# Patient Record
Sex: FEMALE | Race: WHITE | NOT HISPANIC OR LATINO | URBAN - METROPOLITAN AREA
[De-identification: names, ages, dates, MRNs, and addresses within clinical notes are randomized per-mention and may not be internally consistent; named-entity substitution may affect disease eponyms.]

---

## 2019-04-26 ENCOUNTER — APPOINTMENT (RX ONLY)
Dept: URBAN - METROPOLITAN AREA OTHER 10 | Facility: OTHER | Age: 64
Setting detail: DERMATOLOGY
End: 2019-04-26

## 2019-04-26 DIAGNOSIS — D22 MELANOCYTIC NEVI: ICD-10-CM

## 2019-04-26 DIAGNOSIS — L81.4 OTHER MELANIN HYPERPIGMENTATION: ICD-10-CM

## 2019-04-26 DIAGNOSIS — L21.8 OTHER SEBORRHEIC DERMATITIS: ICD-10-CM

## 2019-04-26 DIAGNOSIS — L73.8 OTHER SPECIFIED FOLLICULAR DISORDERS: ICD-10-CM

## 2019-04-26 DIAGNOSIS — D18.0 HEMANGIOMA: ICD-10-CM

## 2019-04-26 DIAGNOSIS — L82.1 OTHER SEBORRHEIC KERATOSIS: ICD-10-CM

## 2019-04-26 DIAGNOSIS — Z41.9 ENCOUNTER FOR PROCEDURE FOR PURPOSES OTHER THAN REMEDYING HEALTH STATE, UNSPECIFIED: ICD-10-CM

## 2019-04-26 DIAGNOSIS — L65.0 TELOGEN EFFLUVIUM: ICD-10-CM

## 2019-04-26 PROBLEM — L40.0 PSORIASIS VULGARIS: Status: ACTIVE | Noted: 2019-04-26

## 2019-04-26 PROBLEM — D22.72 MELANOCYTIC NEVI OF LEFT LOWER LIMB, INCLUDING HIP: Status: ACTIVE | Noted: 2019-04-26

## 2019-04-26 PROBLEM — D22.71 MELANOCYTIC NEVI OF RIGHT LOWER LIMB, INCLUDING HIP: Status: ACTIVE | Noted: 2019-04-26

## 2019-04-26 PROBLEM — D22.61 MELANOCYTIC NEVI OF RIGHT UPPER LIMB, INCLUDING SHOULDER: Status: ACTIVE | Noted: 2019-04-26

## 2019-04-26 PROBLEM — I10 ESSENTIAL (PRIMARY) HYPERTENSION: Status: ACTIVE | Noted: 2019-04-26

## 2019-04-26 PROBLEM — D18.01 HEMANGIOMA OF SKIN AND SUBCUTANEOUS TISSUE: Status: ACTIVE | Noted: 2019-04-26

## 2019-04-26 PROBLEM — I48.91 UNSPECIFIED ATRIAL FIBRILLATION: Status: ACTIVE | Noted: 2019-04-26

## 2019-04-26 PROBLEM — D22.62 MELANOCYTIC NEVI OF LEFT UPPER LIMB, INCLUDING SHOULDER: Status: ACTIVE | Noted: 2019-04-26

## 2019-04-26 PROBLEM — D22.5 MELANOCYTIC NEVI OF TRUNK: Status: ACTIVE | Noted: 2019-04-26

## 2019-04-26 PROCEDURE — ? COUNSELING

## 2019-04-26 PROCEDURE — 99203 OFFICE O/P NEW LOW 30 MIN: CPT

## 2019-04-26 PROCEDURE — ? OTHER

## 2019-04-26 PROCEDURE — ? OTHER (COSMETIC)

## 2019-04-26 ASSESSMENT — LOCATION DETAILED DESCRIPTION DERM
LOCATION DETAILED: RIGHT VENTRAL DISTAL FOREARM
LOCATION DETAILED: RIGHT DISTAL POSTERIOR THIGH
LOCATION DETAILED: RIGHT PROXIMAL DORSAL FOREARM
LOCATION DETAILED: RIGHT INFERIOR UPPER BACK
LOCATION DETAILED: LEFT PROXIMAL POSTERIOR THIGH
LOCATION DETAILED: RIGHT ANTERIOR DISTAL THIGH
LOCATION DETAILED: LEFT DISTAL DORSAL FOREARM
LOCATION DETAILED: LEFT DISTAL POSTERIOR THIGH
LOCATION DETAILED: LEFT SUPERIOR FOREHEAD
LOCATION DETAILED: MID-FRONTAL SCALP
LOCATION DETAILED: LEFT ANTERIOR EARLOBE
LOCATION DETAILED: EPIGASTRIC SKIN
LOCATION DETAILED: LEFT ANTERIOR PROXIMAL UPPER ARM
LOCATION DETAILED: INFERIOR MID FOREHEAD
LOCATION DETAILED: SUPERIOR LUMBAR SPINE
LOCATION DETAILED: LEFT VENTRAL PROXIMAL FOREARM
LOCATION DETAILED: RIGHT VENTRAL PROXIMAL FOREARM
LOCATION DETAILED: LEFT ANTERIOR PROXIMAL THIGH

## 2019-04-26 ASSESSMENT — LOCATION SIMPLE DESCRIPTION DERM
LOCATION SIMPLE: RIGHT THIGH
LOCATION SIMPLE: RIGHT UPPER BACK
LOCATION SIMPLE: RIGHT POSTERIOR THIGH
LOCATION SIMPLE: LEFT THIGH
LOCATION SIMPLE: RIGHT FOREARM
LOCATION SIMPLE: ANTERIOR SCALP
LOCATION SIMPLE: LEFT FOREARM
LOCATION SIMPLE: LEFT UPPER ARM
LOCATION SIMPLE: ABDOMEN
LOCATION SIMPLE: LOWER BACK
LOCATION SIMPLE: LEFT FOREHEAD
LOCATION SIMPLE: INFERIOR FOREHEAD
LOCATION SIMPLE: LEFT EAR
LOCATION SIMPLE: LEFT POSTERIOR THIGH

## 2019-04-26 ASSESSMENT — LOCATION ZONE DERM
LOCATION ZONE: FACE
LOCATION ZONE: LEG
LOCATION ZONE: SCALP
LOCATION ZONE: ARM
LOCATION ZONE: EAR
LOCATION ZONE: TRUNK

## 2019-04-26 NOTE — PROCEDURE: OTHER
Detail Level: Simple
Note Text (......Xxx Chief Complaint.): This diagnosis correlates with the
Other (Free Text): Continue TGel  shampoo as needed

## 2020-07-20 ENCOUNTER — OFFICE VISIT (OUTPATIENT)
Dept: URBAN - METROPOLITAN AREA CLINIC 96 | Facility: CLINIC | Age: 65
End: 2020-07-20
Payer: COMMERCIAL

## 2020-07-20 ENCOUNTER — WEB ENCOUNTER (OUTPATIENT)
Dept: URBAN - METROPOLITAN AREA CLINIC 96 | Facility: CLINIC | Age: 65
End: 2020-07-20

## 2020-07-20 DIAGNOSIS — K76.0 FATTY LIVER: ICD-10-CM

## 2020-07-20 DIAGNOSIS — R06.02 SHORTNESS OF BREATH: ICD-10-CM

## 2020-07-20 DIAGNOSIS — E66.9 OBESITY: ICD-10-CM

## 2020-07-20 DIAGNOSIS — R53.83 FATIGUE: ICD-10-CM

## 2020-07-20 DIAGNOSIS — K92.1 RECTAL BLEEDING: ICD-10-CM

## 2020-07-20 DIAGNOSIS — I48.91 AFIB: ICD-10-CM

## 2020-07-20 DIAGNOSIS — Z79.01 ANTICOAGULANT LONG-TERM USE: ICD-10-CM

## 2020-07-20 DIAGNOSIS — K51.90 ULCERATIVE COLITIS: ICD-10-CM

## 2020-07-20 PROCEDURE — 99213 OFFICE O/P EST LOW 20 MIN: CPT | Performed by: INTERNAL MEDICINE

## 2020-07-20 RX ORDER — FOLIC ACID 1 MG/1
TAKE 1 TABLET (1 MG) BY ORAL ROUTE ONCE DAILY FOR 90 DAYS TABLET ORAL 1
Qty: 90 | Refills: 3 | Status: ACTIVE | COMMUNITY
Start: 2020-03-24 | End: 2021-03-19

## 2020-07-20 RX ORDER — SULFASALAZINE 500 MG/1
TAKE 4 TABLETS BY ORAL ROUTE 2 TIMES A DAY FOR 90 DAYS TABLET ORAL 2
Qty: 720 | Refills: 1 | Status: ACTIVE | COMMUNITY
Start: 2020-03-24 | End: 2020-09-20

## 2020-07-20 RX ORDER — LEVOTHYROXINE SODIUM 112 UG/1
TABLET ORAL
Qty: 0 | Refills: 0 | Status: ACTIVE | COMMUNITY
Start: 1900-01-01

## 2020-07-20 RX ORDER — ESOMEPRAZOLE MAGNESIUM 40 MG
TAKE ONE CAPSULE BY MOUTH ONE TIME DAILY CAPSULE,DELAYED RELEASE (ENTERIC COATED) ORAL
Qty: 90 | Refills: 3 | Status: ACTIVE | COMMUNITY
Start: 2019-08-12 | End: 2020-08-06

## 2020-07-20 RX ORDER — MESALAMINE 1000 MG/1
INSERT 1 SUPPOSITORY BY RECTAL ROUTE DAILY SUPPOSITORY RECTAL 1
Qty: 90 | Refills: 1 | Status: ACTIVE | COMMUNITY
Start: 2020-03-24

## 2020-07-20 RX ORDER — PRAMOXINE HYDROCHLORIDE 150 MG/15G
AS DIRECTED AEROSOL, FOAM RECTAL BID
Qty: 20 | Refills: 1 | OUTPATIENT
Start: 2020-07-20 | End: 2020-08-09

## 2020-07-20 RX ORDER — SODIUM, POTASSIUM,MAG SULFATES 17.5-3.13G
177 ML SOLUTION, RECONSTITUTED, ORAL ORAL AS DIRECTED
Qty: 1 BOX | Refills: 0 | OUTPATIENT
Start: 2020-07-20 | End: 2020-07-21

## 2020-07-20 NOTE — HPI-TODAY'S VISIT:
64 y.o. WF Hemorrhoid bled for several months then quit Concerned anemic b/c weak Can hear it dripping Blood squirting out Not sure if afib or not b/c fatigued w/ walking Some mucus in stool No diarrhea Doesn't feel like colitis

## 2020-07-21 ENCOUNTER — TELEPHONE ENCOUNTER (OUTPATIENT)
Dept: URBAN - METROPOLITAN AREA CLINIC 92 | Facility: CLINIC | Age: 65
End: 2020-07-21

## 2020-07-21 ENCOUNTER — WEB ENCOUNTER (OUTPATIENT)
Dept: URBAN - METROPOLITAN AREA CLINIC 92 | Facility: CLINIC | Age: 65
End: 2020-07-21

## 2020-07-21 LAB
A/G RATIO: 1.4
ALBUMIN: 4.3
ALKALINE PHOSPHATASE: 88
ALT (SGPT): 12
AST (SGOT): 19
BILIRUBIN, TOTAL: <0.2
BUN/CREATININE RATIO: 14
BUN: 12
C-REACTIVE PROTEIN, QUANT: 9
CALCIUM: 9.5
CARBON DIOXIDE, TOTAL: 21
CHLORIDE: 105
CREATININE: 0.83
EGFR IF AFRICN AM: 87
EGFR IF NONAFRICN AM: 75
FERRITIN, SERUM: 10
GLOBULIN, TOTAL: 3
GLUCOSE: 108
HEMATOCRIT: 30.9
HEMOGLOBIN: 9
MCH: 22.6
MCHC: 29.1
MCV: 77
NRBC: (no result)
PLATELETS: 454
POTASSIUM: 4.6
PROTEIN, TOTAL: 7.3
RBC: 3.99
RDW: 17.9
SODIUM: 142
VITAMIN B12: 467
VITAMIN D, 25-HYDROXY: 37.2
WBC: 12.7

## 2020-07-21 RX ORDER — FERROUS SULFATE TAB EC 325 MG (65 MG FE EQUIVALENT) 325 (65 FE) MG
1 TABLET TABLET DELAYED RESPONSE ORAL BID
Qty: 180 TABLET | Refills: 0 | OUTPATIENT
Start: 2020-07-21

## 2020-07-21 RX ORDER — FERRIC CARBOXYMALTOSE INJECTION 50 MG/ML
AS DIRECTED INJECTION, SOLUTION INTRAVENOUS
Qty: 1 | Refills: 0 | OUTPATIENT
Start: 2020-07-21

## 2020-07-28 ENCOUNTER — LAB OUTSIDE AN ENCOUNTER (OUTPATIENT)
Dept: URBAN - METROPOLITAN AREA CLINIC 92 | Facility: CLINIC | Age: 65
End: 2020-07-28

## 2020-08-12 ENCOUNTER — OFFICE VISIT (OUTPATIENT)
Dept: URBAN - METROPOLITAN AREA CLINIC 97 | Facility: CLINIC | Age: 65
End: 2020-08-12
Payer: COMMERCIAL

## 2020-08-12 VITALS
TEMPERATURE: 97.6 F | BODY MASS INDEX: 36.96 KG/M2 | RESPIRATION RATE: 18 BRPM | DIASTOLIC BLOOD PRESSURE: 67 MMHG | HEIGHT: 66 IN | WEIGHT: 230 LBS | SYSTOLIC BLOOD PRESSURE: 114 MMHG

## 2020-08-12 DIAGNOSIS — E61.1 IRON DEFICIENCY: ICD-10-CM

## 2020-08-12 PROCEDURE — 96365 THER/PROPH/DIAG IV INF INIT: CPT | Performed by: INTERNAL MEDICINE

## 2020-08-12 RX ORDER — FOLIC ACID 1 MG/1
TAKE 1 TABLET (1 MG) BY ORAL ROUTE ONCE DAILY FOR 90 DAYS TABLET ORAL 1
Qty: 90 | Refills: 3 | Status: ACTIVE | COMMUNITY
Start: 2020-03-24 | End: 2021-03-19

## 2020-08-12 RX ORDER — FERROUS SULFATE TAB EC 325 MG (65 MG FE EQUIVALENT) 325 (65 FE) MG
1 TABLET TABLET DELAYED RESPONSE ORAL BID
Qty: 180 TABLET | Refills: 0 | Status: ACTIVE | COMMUNITY
Start: 2020-07-21

## 2020-08-12 RX ORDER — MESALAMINE 1000 MG/1
INSERT 1 SUPPOSITORY BY RECTAL ROUTE DAILY SUPPOSITORY RECTAL 1
Qty: 90 | Refills: 1 | Status: ACTIVE | COMMUNITY
Start: 2020-03-24

## 2020-08-12 RX ORDER — FERRIC CARBOXYMALTOSE INJECTION 50 MG/ML
AS DIRECTED INJECTION, SOLUTION INTRAVENOUS
Qty: 1 | Refills: 0 | Status: ACTIVE | COMMUNITY
Start: 2020-07-21

## 2020-08-12 RX ORDER — SULFASALAZINE 500 MG/1
TAKE 4 TABLETS BY ORAL ROUTE 2 TIMES A DAY FOR 90 DAYS TABLET ORAL 2
Qty: 720 | Refills: 1 | Status: ACTIVE | COMMUNITY
Start: 2020-03-24 | End: 2020-09-20

## 2020-08-12 RX ORDER — LEVOTHYROXINE SODIUM 112 UG/1
TABLET ORAL
Qty: 0 | Refills: 0 | Status: ACTIVE | COMMUNITY
Start: 1900-01-01

## 2020-08-19 ENCOUNTER — OFFICE VISIT (OUTPATIENT)
Dept: URBAN - METROPOLITAN AREA CLINIC 97 | Facility: CLINIC | Age: 65
End: 2020-08-19
Payer: COMMERCIAL

## 2020-08-19 VITALS
TEMPERATURE: 96.8 F | HEART RATE: 84 BPM | BODY MASS INDEX: 36.96 KG/M2 | HEIGHT: 66 IN | DIASTOLIC BLOOD PRESSURE: 66 MMHG | RESPIRATION RATE: 18 BRPM | WEIGHT: 230 LBS | SYSTOLIC BLOOD PRESSURE: 101 MMHG

## 2020-08-19 DIAGNOSIS — E61.1 IRON DEFICIENCY: ICD-10-CM

## 2020-08-19 PROCEDURE — 96365 THER/PROPH/DIAG IV INF INIT: CPT | Performed by: INTERNAL MEDICINE

## 2020-08-19 RX ORDER — MESALAMINE 1000 MG/1
INSERT 1 SUPPOSITORY BY RECTAL ROUTE DAILY SUPPOSITORY RECTAL 1
Qty: 90 | Refills: 1 | Status: ACTIVE | COMMUNITY
Start: 2020-03-24

## 2020-08-19 RX ORDER — FOLIC ACID 1 MG/1
TAKE 1 TABLET (1 MG) BY ORAL ROUTE ONCE DAILY FOR 90 DAYS TABLET ORAL 1
Qty: 90 | Refills: 3 | Status: ACTIVE | COMMUNITY
Start: 2020-03-24 | End: 2021-03-19

## 2020-08-19 RX ORDER — LEVOTHYROXINE SODIUM 112 UG/1
TABLET ORAL
Qty: 0 | Refills: 0 | Status: ACTIVE | COMMUNITY
Start: 1900-01-01

## 2020-08-19 RX ORDER — FERRIC CARBOXYMALTOSE INJECTION 50 MG/ML
AS DIRECTED INJECTION, SOLUTION INTRAVENOUS
Qty: 1 | Refills: 0 | Status: ACTIVE | COMMUNITY
Start: 2020-07-21

## 2020-08-19 RX ORDER — FERROUS SULFATE TAB EC 325 MG (65 MG FE EQUIVALENT) 325 (65 FE) MG
1 TABLET TABLET DELAYED RESPONSE ORAL BID
Qty: 180 TABLET | Refills: 0 | Status: ACTIVE | COMMUNITY
Start: 2020-07-21

## 2020-08-19 RX ORDER — SULFASALAZINE 500 MG/1
TAKE 4 TABLETS BY ORAL ROUTE 2 TIMES A DAY FOR 90 DAYS TABLET ORAL 2
Qty: 720 | Refills: 1 | Status: ACTIVE | COMMUNITY
Start: 2020-03-24 | End: 2020-09-20

## 2020-08-25 ENCOUNTER — OFFICE VISIT (OUTPATIENT)
Dept: URBAN - METROPOLITAN AREA SURGERY CENTER 18 | Facility: SURGERY CENTER | Age: 65
End: 2020-08-25

## 2020-10-26 ENCOUNTER — TELEPHONE ENCOUNTER (OUTPATIENT)
Dept: URBAN - METROPOLITAN AREA CLINIC 96 | Facility: CLINIC | Age: 65
End: 2020-10-26

## 2020-11-24 ENCOUNTER — TELEPHONE ENCOUNTER (OUTPATIENT)
Dept: URBAN - METROPOLITAN AREA CLINIC 96 | Facility: CLINIC | Age: 65
End: 2020-11-24

## 2020-11-24 RX ORDER — ESOMEPRAZOLE MAGNESIUM 40 MG
TAKE ONE CAPSULE BY MOUTH ONE TIME DAILY CAPSULE,DELAYED RELEASE (ENTERIC COATED) ORAL ONCE A DAY
Qty: 90 | Refills: 1

## 2020-12-08 ENCOUNTER — OFFICE VISIT (OUTPATIENT)
Dept: URBAN - METROPOLITAN AREA SURGERY CENTER 18 | Facility: SURGERY CENTER | Age: 65
End: 2020-12-08
Payer: COMMERCIAL

## 2020-12-08 DIAGNOSIS — K51.011 CHRONIC ULCERATIVE ENTEROCOLITIS WITH RECTAL BLEEDING: ICD-10-CM

## 2020-12-08 DIAGNOSIS — K31.7 BENIGN GASTRIC POLYP: ICD-10-CM

## 2020-12-08 DIAGNOSIS — K22.8 COLUMNAR-LINED ESOPHAGUS: ICD-10-CM

## 2020-12-08 DIAGNOSIS — D50.9 ANEMIA, IRON DEFICIENCY: ICD-10-CM

## 2020-12-08 DIAGNOSIS — K29.30 CHRONIC SUPERFICIAL GASTRITIS: ICD-10-CM

## 2020-12-08 PROCEDURE — G9937 DIG OR SURV COLSCO: HCPCS | Performed by: INTERNAL MEDICINE

## 2020-12-08 PROCEDURE — G8907 PT DOC NO EVENTS ON DISCHARG: HCPCS | Performed by: INTERNAL MEDICINE

## 2020-12-08 PROCEDURE — 45380 COLONOSCOPY AND BIOPSY: CPT | Performed by: INTERNAL MEDICINE

## 2020-12-08 PROCEDURE — 43239 EGD BIOPSY SINGLE/MULTIPLE: CPT | Performed by: INTERNAL MEDICINE

## 2020-12-08 RX ORDER — FOLIC ACID 1 MG/1
TAKE 1 TABLET (1 MG) BY ORAL ROUTE ONCE DAILY FOR 90 DAYS TABLET ORAL 1
Qty: 90 | Refills: 3 | Status: ACTIVE | COMMUNITY
Start: 2020-03-24 | End: 2021-03-19

## 2020-12-08 RX ORDER — MESALAMINE 1000 MG/1
INSERT 1 SUPPOSITORY BY RECTAL ROUTE DAILY SUPPOSITORY RECTAL 1
Qty: 90 | Refills: 1 | Status: ACTIVE | COMMUNITY
Start: 2020-03-24

## 2020-12-08 RX ORDER — LEVOTHYROXINE SODIUM 112 UG/1
TABLET ORAL
Qty: 0 | Refills: 0 | Status: ACTIVE | COMMUNITY
Start: 1900-01-01

## 2020-12-08 RX ORDER — FERROUS SULFATE TAB EC 325 MG (65 MG FE EQUIVALENT) 325 (65 FE) MG
1 TABLET TABLET DELAYED RESPONSE ORAL BID
Qty: 180 TABLET | Refills: 0 | Status: ACTIVE | COMMUNITY
Start: 2020-07-21

## 2020-12-08 RX ORDER — ESOMEPRAZOLE MAGNESIUM 40 MG
TAKE ONE CAPSULE BY MOUTH ONE TIME DAILY CAPSULE,DELAYED RELEASE (ENTERIC COATED) ORAL ONCE A DAY
Qty: 90 | Refills: 1 | Status: ACTIVE | COMMUNITY

## 2020-12-08 RX ORDER — FERRIC CARBOXYMALTOSE INJECTION 50 MG/ML
AS DIRECTED INJECTION, SOLUTION INTRAVENOUS
Qty: 1 | Refills: 0 | Status: ACTIVE | COMMUNITY
Start: 2020-07-21

## 2020-12-10 ENCOUNTER — WEB ENCOUNTER (OUTPATIENT)
Dept: URBAN - METROPOLITAN AREA CLINIC 92 | Facility: CLINIC | Age: 65
End: 2020-12-10

## 2020-12-10 RX ORDER — PREDNISONE 20 MG/1
1 TABLET TABLET ORAL
Qty: 5 | Refills: 0 | OUTPATIENT

## 2020-12-10 RX ORDER — MESALAMINE 4 G/60ML
AS DIRECTED SUSPENSION RECTAL AT BEDTIME
Qty: 30 | Refills: 1 | OUTPATIENT

## 2021-02-05 ENCOUNTER — OFFICE VISIT (OUTPATIENT)
Dept: URBAN - METROPOLITAN AREA CLINIC 96 | Facility: CLINIC | Age: 66
End: 2021-02-05

## 2021-06-15 ENCOUNTER — TELEPHONE ENCOUNTER (OUTPATIENT)
Dept: URBAN - METROPOLITAN AREA CLINIC 98 | Facility: CLINIC | Age: 66
End: 2021-06-15

## 2021-06-15 RX ORDER — ESOMEPRAZOLE MAGNESIUM 40 MG
TAKE ONE CAPSULE BY MOUTH ONE TIME DAILY CAPSULE,DELAYED RELEASE (ENTERIC COATED) ORAL ONCE A DAY
Qty: 90 | Refills: 1

## 2023-11-27 ENCOUNTER — LAB OUTSIDE AN ENCOUNTER (OUTPATIENT)
Dept: URBAN - METROPOLITAN AREA CLINIC 50 | Facility: CLINIC | Age: 68
End: 2023-11-27

## 2023-11-27 ENCOUNTER — OFFICE VISIT (OUTPATIENT)
Dept: URBAN - METROPOLITAN AREA CLINIC 50 | Facility: CLINIC | Age: 68
End: 2023-11-27
Payer: COMMERCIAL

## 2023-11-27 VITALS
HEART RATE: 70 BPM | BODY MASS INDEX: 39.05 KG/M2 | SYSTOLIC BLOOD PRESSURE: 135 MMHG | HEIGHT: 66 IN | DIASTOLIC BLOOD PRESSURE: 82 MMHG | WEIGHT: 243 LBS | TEMPERATURE: 97.8 F

## 2023-11-27 DIAGNOSIS — Z79.01 ANTICOAGULANT LONG-TERM USE: ICD-10-CM

## 2023-11-27 DIAGNOSIS — K21.9 GERD (GASTROESOPHAGEAL REFLUX DISEASE): ICD-10-CM

## 2023-11-27 DIAGNOSIS — K51.00 CHRONIC ULCERATIVE PANCOLITIS: ICD-10-CM

## 2023-11-27 DIAGNOSIS — K64.9 HEMORRHOIDS, UNSPECIFIED HEMORRHOID TYPE: ICD-10-CM

## 2023-11-27 PROBLEM — 70153002: Status: ACTIVE | Noted: 2023-11-27

## 2023-11-27 PROCEDURE — 99214 OFFICE O/P EST MOD 30 MIN: CPT | Performed by: PHYSICIAN ASSISTANT

## 2023-11-27 RX ORDER — SODIUM, POTASSIUM,MAG SULFATES 17.5-3.13G
177ML SOLUTION, RECONSTITUTED, ORAL ORAL
Qty: 1 | Refills: 0 | OUTPATIENT
Start: 2023-11-27 | End: 2023-11-28

## 2023-11-27 RX ORDER — MESALAMINE 1000 MG/1
_INSERT 1 SUPPOSITORY BY RECTAL ROUTE DAILY SUPPOSITORY RECTAL 1
Qty: 90 | Refills: 1
Start: 2020-03-24 | End: 2024-05-25

## 2023-11-27 RX ORDER — MESALAMINE 1000 MG/1
INSERT 1 SUPPOSITORY BY RECTAL ROUTE DAILY SUPPOSITORY RECTAL 1
Qty: 90 | Refills: 1 | Status: ON HOLD | COMMUNITY
Start: 2020-03-24

## 2023-11-27 RX ORDER — SULFASALAZINE 500 MG/1
4 TABLETS TABLET ORAL TWICE DAILY
Qty: 240 | Refills: 11 | OUTPATIENT
Start: 2023-11-27 | End: 2024-11-21

## 2023-11-27 NOTE — HPI-TODAY'S VISIT:
Patient is a 66 y/o F of Dr. Heather Ricardo w hx ulcerative colitis here to discuss C-scope update She presents recent labs from PCP from 11/21/23, CBC, CMP unremarkable w H/H 14.3/43.4, CRP high 10.4 Does have significant hx hemorrhoids, states past year or so less issues w this, did get some bright red blood on toilet paper only w heavy wiping more recently once but this is very rare now Did not proceed w proctology consult/hemorrhoid repair as advised in past as is hesistant to consider surgical intervention for hemorrhiods given no anemia/mostly asx Describes bowel movements presently as 1-2x/day, no blood/mucus/melena in stool otherwise No abdominal pains, no nausea/vomiting, no reflux issues on nexium, no dysphagia No rashes, occasional joint pains associated w heavy activity/yard work but otherwise doing well No abnormal weight loss Continues use nexium filled by PCP for GERD Using sulfasalazine 8 tabs daily Not routinely using canasa suppository but would like refilled as prefers this on hand in case she were to get a flare Cardiology perscribes Dr. Jon fuchs, [facility] Can do stairs without issue

## 2024-01-24 ENCOUNTER — OFFICE VISIT (OUTPATIENT)
Dept: URBAN - METROPOLITAN AREA SURGERY CENTER 18 | Facility: SURGERY CENTER | Age: 69
End: 2024-01-24
Payer: COMMERCIAL

## 2024-01-24 ENCOUNTER — CLAIMS CREATED FROM THE CLAIM WINDOW (OUTPATIENT)
Dept: URBAN - METROPOLITAN AREA CLINIC 4 | Facility: CLINIC | Age: 69
End: 2024-01-24
Payer: COMMERCIAL

## 2024-01-24 DIAGNOSIS — D12.3 ADENOMA OF TRANSVERSE COLON: ICD-10-CM

## 2024-01-24 DIAGNOSIS — K64.9 ACUTE HEMORRHOID: ICD-10-CM

## 2024-01-24 DIAGNOSIS — K51.80 CHRONIC PANCOLONIC ULCERATIVE COLITIS: ICD-10-CM

## 2024-01-24 DIAGNOSIS — K63.89 OTHER SPECIFIED DISEASES OF INTESTINE: ICD-10-CM

## 2024-01-24 DIAGNOSIS — K51.20 ACUTE ULCERATIVE PROCTITIS: ICD-10-CM

## 2024-01-24 DIAGNOSIS — K63.9 ABNORMALITY OF COLON: ICD-10-CM

## 2024-01-24 PROCEDURE — 00811 ANES LWR INTST NDSC NOS: CPT | Performed by: REGISTERED NURSE

## 2024-01-24 PROCEDURE — G8907 PT DOC NO EVENTS ON DISCHARG: HCPCS | Performed by: INTERNAL MEDICINE

## 2024-01-24 PROCEDURE — 45380 COLONOSCOPY AND BIOPSY: CPT | Performed by: INTERNAL MEDICINE

## 2024-01-24 PROCEDURE — 88305 TISSUE EXAM BY PATHOLOGIST: CPT | Performed by: PATHOLOGY

## 2024-01-24 RX ORDER — SULFASALAZINE 500 MG/1
4 TABLETS TABLET ORAL TWICE DAILY
Qty: 240 | Refills: 11 | Status: ACTIVE | COMMUNITY
Start: 2023-11-27 | End: 2024-11-21

## 2024-01-24 RX ORDER — MESALAMINE 1000 MG/1
_INSERT 1 SUPPOSITORY BY RECTAL ROUTE DAILY SUPPOSITORY RECTAL 1
Qty: 90 | Refills: 1 | Status: ACTIVE | COMMUNITY
Start: 2020-03-24 | End: 2024-05-25

## 2024-01-24 RX ORDER — SULFASALAZINE 500 MG/1
4 TABLETS TABLET ORAL TWICE DAILY
Qty: 720 | Refills: 2
Start: 2023-11-27 | End: 2029-12-23

## 2024-01-24 RX ORDER — FOLIC ACID 1 MG/1
1 TABLET TABLET ORAL ONCE A DAY
Qty: 90 TABLET | Refills: 2 | OUTPATIENT
Start: 2024-01-24 | End: 2024-10-20

## 2024-01-26 ENCOUNTER — DASHBOARD ENCOUNTERS (OUTPATIENT)
Age: 69
End: 2024-01-26

## 2025-03-19 ENCOUNTER — TELEPHONE ENCOUNTER (OUTPATIENT)
Dept: URBAN - METROPOLITAN AREA CLINIC 50 | Facility: CLINIC | Age: 70
End: 2025-03-19

## 2025-03-19 RX ORDER — FOLIC ACID 1 MG/1
1 TABLET TABLET ORAL ONCE A DAY
Qty: 90 TABLET | Refills: 0
Start: 2024-01-24 | End: 2025-06-17

## 2025-04-03 ENCOUNTER — OFFICE VISIT (OUTPATIENT)
Dept: URBAN - METROPOLITAN AREA CLINIC 50 | Facility: CLINIC | Age: 70
End: 2025-04-03
Payer: COMMERCIAL

## 2025-04-03 DIAGNOSIS — K51.00 CHRONIC ULCERATIVE PANCOLITIS: ICD-10-CM

## 2025-04-03 DIAGNOSIS — L40.9 PSORIASIS: ICD-10-CM

## 2025-04-03 DIAGNOSIS — K64.9 HEMORRHOIDS, UNSPECIFIED HEMORRHOID TYPE: ICD-10-CM

## 2025-04-03 DIAGNOSIS — K21.9 GERD (GASTROESOPHAGEAL REFLUX DISEASE): ICD-10-CM

## 2025-04-03 PROCEDURE — 99214 OFFICE O/P EST MOD 30 MIN: CPT | Performed by: PHYSICIAN ASSISTANT

## 2025-04-03 RX ORDER — FOLIC ACID 1 MG/1
1 TABLET TABLET ORAL ONCE A DAY
Qty: 90 TABLET | Refills: 3
Start: 2024-01-24

## 2025-04-03 RX ORDER — SULFASALAZINE 500 MG/1
4 TABLETS TABLET ORAL TWICE DAILY
Qty: 720 | Refills: 3
Start: 2023-11-27 | End: 2033-02-20

## 2025-04-03 RX ORDER — SULFASALAZINE 500 MG/1
4 TABLETS TABLET ORAL TWICE DAILY
Qty: 720 | Refills: 2 | Status: ACTIVE | COMMUNITY
Start: 2023-11-27 | End: 2029-12-23

## 2025-04-03 RX ORDER — FOLIC ACID 1 MG/1
1 TABLET TABLET ORAL ONCE A DAY
Qty: 90 TABLET | Refills: 0 | Status: ACTIVE | COMMUNITY
Start: 2024-01-24 | End: 2025-06-17

## 2025-04-03 RX ORDER — HYDROCORTISONE ACETATE 25 MG/1
1 SUPPOSITORY SUPPOSITORY RECTAL ONCE A DAY
Qty: 14 SUPPOSITORY | Refills: 1 | OUTPATIENT
Start: 2025-04-03 | End: 2025-05-01

## 2025-04-03 NOTE — HPI-TODAY'S VISIT:
4/3/25: 68 y/o F here f/u annual UC Overall doing well, no routine issues If off nexium, gets some epigastric pains but if using consistently has no issues No indigsetion/heartburn, no nausea/vomiting Bowels normal, BMs 1x/day, no blood/mucus/melena, unchanged lately No diarrhea No joint pains or rashes Working on intetional wt loss w diet/exercise Has orders pending per PCP and planning on having drawn within the next week Some incr psoriasis rashes/itching, due to see dermatologist Up to date on flu shot -- 11/27/23: Patient is a 68 y/o F of Dr. Heather Ricardo w hx ulcerative colitis here to discuss C-scope update She presents recent labs from PCP from 11/21/23, CBC, CMP unremarkable w H/H 14.3/43.4, CRP high 10.4 Does have significant hx hemorrhoids, states past year or so less issues w this, did get some bright red blood on toilet paper only w heavy wiping more recently once but this is very rare now Did not proceed w proctology consult/hemorrhoid repair as advised in past as is hesistant to consider surgical intervention for hemorrhiods given no anemia/mostly asx Describes bowel movements presently as 1-2x/day, no blood/mucus/melena in stool otherwise No abdominal pains, no nausea/vomiting, no reflux issues on nexium, no dysphagia No rashes, occasional joint pains associated w heavy activity/yard work but otherwise doing well No abnormal weight loss Continues use nexium filled by PCP for GERD Using sulfasalazine 8 tabs daily Not routinely using canasa suppository but would like refilled as prefers this on hand in case she were to get a flare Cardiology perscribes Dr. Jon fuchs, [facility] Can do stairs without issue

## 2025-04-06 PROBLEM — 9014002: Status: ACTIVE | Noted: 2025-04-06

## 2025-04-18 LAB
A/G RATIO: 1.4
ALBUMIN: 4.4
ALKALINE PHOSPHATASE: 76
ALT (SGPT): 12
AST (SGOT): 16
BILIRUBIN, TOTAL: 0.3
BUN/CREATININE RATIO: (no result)
BUN: 14
C-REACTIVE PROTEIN, QUANT: 9.4
CALCIUM: 9.3
CARBON DIOXIDE, TOTAL: 17
CHLORIDE: 106
CREATININE: 0.85
EGFR: 75
FERRITIN, SERUM: 23
GLOBULIN, TOTAL: 3.1
GLUCOSE: 115
POTASSIUM: 3.8
PROTEIN, TOTAL: 7.5
SODIUM: 143
WHITE BLOOD CELL COUNT: (no result)